# Patient Record
Sex: MALE | Race: WHITE | NOT HISPANIC OR LATINO | Employment: OTHER | URBAN - METROPOLITAN AREA
[De-identification: names, ages, dates, MRNs, and addresses within clinical notes are randomized per-mention and may not be internally consistent; named-entity substitution may affect disease eponyms.]

---

## 2020-07-02 ENCOUNTER — EVALUATION (OUTPATIENT)
Dept: PHYSICAL THERAPY | Facility: CLINIC | Age: 85
End: 2020-07-02
Payer: COMMERCIAL

## 2020-07-02 ENCOUNTER — TRANSCRIBE ORDERS (OUTPATIENT)
Dept: PHYSICAL THERAPY | Facility: CLINIC | Age: 85
End: 2020-07-02

## 2020-07-02 DIAGNOSIS — Z96.642 STATUS POST TOTAL REPLACEMENT OF LEFT HIP: Primary | ICD-10-CM

## 2020-07-02 PROCEDURE — 97161 PT EVAL LOW COMPLEX 20 MIN: CPT | Performed by: PHYSICAL THERAPIST

## 2020-07-02 RX ORDER — AMLODIPINE BESYLATE AND ATORVASTATIN CALCIUM 10; 10 MG/1; MG/1
1 TABLET, FILM COATED ORAL DAILY
COMMUNITY

## 2020-07-02 RX ORDER — ASPIRIN 325 MG
325 TABLET ORAL DAILY
COMMUNITY

## 2020-07-02 RX ORDER — LISINOPRIL 10 MG/1
10 TABLET ORAL DAILY
COMMUNITY

## 2020-07-02 RX ORDER — HYDROCODONE BITARTRATE AND ACETAMINOPHEN 5; 325 MG/1; MG/1
1 TABLET ORAL EVERY 6 HOURS PRN
COMMUNITY

## 2020-07-02 RX ORDER — CARVEDILOL 12.5 MG/1
12.5 TABLET ORAL 2 TIMES DAILY WITH MEALS
COMMUNITY

## 2020-07-02 NOTE — PROGRESS NOTES
PT Evaluation     Today's date: 2020  Patient name: Jorge Rose  : 1935  MRN: [id number]  Referring provider: Kylah Seo MD  Dx:   Encounter Diagnosis     ICD-10-CM    1  Status post total replacement of left hip Z96 642        Start Time: 1034  Stop Time: 1115  Total time in clinic (min): 41 minutes    Assessment/Plan  This patient is 2 days s/p L THR  Current problems include decreased ROM, decreased strength, impaired function and pain  He is a good candidate for skilled physical therapy to restore previous level of function  Progress per post-op protocol  Interventions to include manual therapy, ROM, stretching, strengthening, gait and balance training, neuromuscular re-ed and instruction in HEP  Duration/ Frequency: 2 x/week for 6-8 weeks    STGs: 4 weeks  1  Increase ROM R hip flexion to 90 degrees  2  Decrease pain 2-3 levels  3  Increase ROM  LTGs: 6-8 weeks  1  Independent HEP  2  Increase strength  3  Increase FOTO score to predicted level    Subjective  This is a 81 y o  male who underwent L THR on 20    Current complaint: pain L anterior thigh, buttock region, L groin  Occupation: retired  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[employer], mows fields  Patients goal: to get back to normal - able to carry drywall, work on car, etc    Objective  Pain scale: 0-10/10  Posture/observation: ambulates to therapy w/ wheeled walker  Integumentary: L hip incision is covered w/ bio-occlusive dressing  Functional mobility:    Ambulates w/ wheeled walker household and short community distances     Stairs: able to ascend / descend 3 steps in house w/ railing, step to step    Bed mobility: requires assistance to lift the LLE on/ off bed or mat    left     Hip   AROM  PROM  Strength  flexion 58 65 3-/5   extension      Abduction 17 17 2/5    adduction      ER      IR      Knee   AROM  PROM  Strength  Flexion   5/5   Extension   5/5   Ankle   AROM  PROM  Strength  DF   5/5   PF      Inversion 5/5   Eversion   5/5     right extremity ROM / strength WFL          Flowsheet Rows      Most Recent Value   PT/OT G-Codes   Current Score  26   Projected Score  54             Precautions: THR  POC expires: 8/2  Daily Treatment Record  Manuals 7/2            PROM L hip                                                    Neuro Re-Ed                                                                                                        Ther Ex             Heel slides             L hip fallout             LAQ             iso hip add             hooklying hip abd w/ TB             Standing hip abd             Standing hip flexion                                                                 Gait Training                                       Modalities             CP PRN

## 2020-07-02 NOTE — LETTER
2020    Val Corrales MD  [address] [place] [address]    Patient: Gabi Dave   YOB: 1935   Date of Visit: 2020     Encounter Diagnosis     ICD-10-CM    1  Status post total replacement of left hip U98 [id number]        Dear Dr Erica Iverson: Thank you for your recent referral of Gabi Dave  Please review the attached evaluation summary from [facility] recent visit  Please verify that you agree with the plan of care by signing the attached order  If you have any questions or concerns, please do not hesitate to call  I sincerely appreciate the opportunity to share in the care of one of your patients and hope to have another opportunity to work with you in the near future  Sincerely,    Devorah Garrett, PT      Referring Provider:      I certify that I have read the below Plan of Care and certify the need for these services furnished under this plan of treatment while under my care  Val Corrales MD  [address] [facility] [place] [address]  VIA Facsimile: [phone]          PT Evaluation     Today's date: 2020  Patient name: aGbi Dave  : 1935  MRN: [id number]  Referring provider: Juan Dsouza MD  Dx:   Encounter Diagnosis     ICD-10-CM    1  Status post total replacement of left hip Z96 642        Start Time: 1034  Stop Time: 1115  Total time in clinic (min): 41 minutes    Assessment/Plan  This patient is 2 days s/p L THR  Current problems include decreased ROM, decreased strength, impaired function and pain  He is a good candidate for skilled physical therapy to restore previous level of function  Progress per post-op protocol  Interventions to include manual therapy, ROM, stretching, strengthening, gait and balance training, neuromuscular re-ed and instruction in HEP  Duration/ Frequency: 2 x/week for 6-8 weeks    STGs: 4 weeks  1  Increase ROM R hip flexion to 90 degrees  2    Decrease pain 2-3 levels  3  Increase ROM  LTGs: 6-8 weeks  1  Independent HEP  2  Increase strength  3  Increase FOTO score to predicted level    Subjective  This is a 81 y o  male who underwent L THR on 6/30/20    Current complaint: pain L anterior thigh, buttock region, L groin  Occupation: retired  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[employer], mows fields  Patients goal: to get back to normal - able to carry drywall, work on car, etc    Objective  Pain scale: 0-10/10  Posture/observation: ambulates to therapy w/ wheeled walker  Integumentary: L hip incision is covered w/ bio-occlusive dressing  Functional mobility:    Ambulates w/ wheeled walker household and short community distances     Stairs: able to ascend / descend 3 steps in house w/ railing, step to step    Bed mobility: requires assistance to lift the LLE on/ off bed or mat    left     Hip   AROM  PROM  Strength  flexion 58 65 3-/5   extension      Abduction 17 17 2/5    adduction      ER      IR      Knee   AROM  PROM  Strength  Flexion   5/5   Extension   5/5   Ankle   AROM  PROM  Strength  DF   5/5   PF      Inversion   5/5   Eversion   5/5     right extremity ROM / strength WFL          Flowsheet Rows      Most Recent Value   PT/OT G-Codes   Current Score  26   Projected Score  54             Precautions: THR  POC expires: 8/2  Daily Treatment Record  Manuals 7/2            PROM L hip                                                    Neuro Re-Ed                                                                                                        Ther Ex             Heel slides             L hip fallout             LAQ             iso hip add             hooklying hip abd w/ TB             Standing hip abd             Standing hip flexion                                                                 Gait Training                                       Modalities             CP PRN

## 2020-07-08 ENCOUNTER — OFFICE VISIT (OUTPATIENT)
Dept: PHYSICAL THERAPY | Facility: CLINIC | Age: 85
End: 2020-07-08
Payer: COMMERCIAL

## 2020-07-08 DIAGNOSIS — Z96.642 STATUS POST TOTAL REPLACEMENT OF LEFT HIP: Primary | ICD-10-CM

## 2020-07-08 PROCEDURE — 97116 GAIT TRAINING THERAPY: CPT | Performed by: PHYSICAL THERAPIST

## 2020-07-08 PROCEDURE — 97140 MANUAL THERAPY 1/> REGIONS: CPT | Performed by: PHYSICAL THERAPIST

## 2020-07-08 NOTE — PROGRESS NOTES
Daily Note     Today's date: 2020  Patient name: Hilary Yuong  : 1935  MRN: [id number]  Referring provider: Alyson Cristobal MD  Dx:   Encounter Diagnosis     ICD-10-CM    1  Status post total replacement of left hip Z96 642        Start Time: 1105  Stop Time: 1159  Total time in clinic (min): 54 minutes    Subjective: not doing well; c/o severe pain in the L groin, buttock and low back; unable to sleep due to severe pain; LLE is swollen, states that he had US on Sat to r/o blood clot  Objective: See treatment diary below      Assessment: Patient presented to therapy ambulating w/ walker, FWB pushing walker in front of him  Reports that he is walking at home without walker  Patient observed to move on table in therapy without adherence  to hip precautiondTherapy consisted of gentle R hip PROM, reinforcement of hip precautions, gait and mobility training  Required frequent cueing to apply hip precautions to mobility activities  PROM very limited but was well tolerated  Applied CP at end of session  Reported feeling better at end of session    Patient would benefit from continued PT      Plan: Continue per plan of care        Precautions: THR  POC expires:   Daily Treatment Record  Manuals            PROM L hip  JR gentle                                                  Neuro Re-Ed                                                                                                        Ther Ex             Heel slides             L hip fallout             LAQ             iso hip add             hooklying hip abd w/ TB             Standing hip abd             Standing hip flexion                          Mat mobility training  8'                                     Gait Training             Walker,WBAT  9'                        Modalities             CP PRN

## 2020-07-13 ENCOUNTER — APPOINTMENT (OUTPATIENT)
Dept: PHYSICAL THERAPY | Facility: CLINIC | Age: 85
End: 2020-07-13
Payer: COMMERCIAL

## 2020-07-15 ENCOUNTER — OFFICE VISIT (OUTPATIENT)
Dept: PHYSICAL THERAPY | Facility: CLINIC | Age: 85
End: 2020-07-15
Payer: COMMERCIAL

## 2020-07-15 DIAGNOSIS — Z96.642 STATUS POST TOTAL REPLACEMENT OF LEFT HIP: Primary | ICD-10-CM

## 2020-07-15 PROCEDURE — 97110 THERAPEUTIC EXERCISES: CPT | Performed by: PHYSICAL THERAPIST

## 2020-07-15 NOTE — PROGRESS NOTES
Daily Note     Today's date: 7/15/2020  Patient name: Hayes Torres  : 1935  MRN: [id number]  Referring provider: Shauna Juárez MD  Dx:   Encounter Diagnosis     ICD-10-CM    1  Status post total replacement of left hip Z96 642        Start Time: 5  Stop Time: 6980  Total time in clinic (min): 34 minutes    Subjective: feeling much better but still has pain in the lateral hip region; not taking any pain meds now except Tylenol; was seen for follow up with surgeon who told him his hip looks good      Objective: See treatment diary below      Assessment:  Mobility is much improved since lv - now able to transfer supine<->sit independently, without difficulty lifting leg on/ off table  Reports some pain with exercises but overall, tolerated therapy well today  Was advised to continue with walker until next therapy visit due to still having relatively high pain levels  Patient would benefit from continued PT      Plan: Continue per plan of care        Precautions: THR 20  POC expires:   Access code: [facility]     Daily Treatment Record  Manuals 7/2 7/8 7/15          PROM L hip  JR gentle JR                                                 Neuro Re-Ed                                                                                                        Ther Ex             Heel slides   10x          L hip fallout   10x          LAQ   15x10"          iso hip add   10x10"          hooklying hip abd w/ TB             Standing hip abd   10x          Standing hip flexion march   10x          Standing SLR flexion   10x          Mat mobility training  8'                                     Gait Training             Walker,WBAT  9'                        Modalities             CP PRN

## 2020-07-20 ENCOUNTER — OFFICE VISIT (OUTPATIENT)
Dept: PHYSICAL THERAPY | Facility: CLINIC | Age: 85
End: 2020-07-20
Payer: COMMERCIAL

## 2020-07-20 DIAGNOSIS — Z96.642 STATUS POST TOTAL REPLACEMENT OF LEFT HIP: Primary | ICD-10-CM

## 2020-07-20 PROCEDURE — 97110 THERAPEUTIC EXERCISES: CPT | Performed by: PHYSICAL THERAPIST

## 2020-07-20 NOTE — PROGRESS NOTES
Daily Note     Today's date: 2020  Patient name: Nicholas Parkinson  : 1935  MRN: [id number]  Referring provider: Lesly Jones MD  Dx:   Encounter Diagnosis     ICD-10-CM    1  Status post total replacement of left hip Z96 642        Start Time:   Stop Time:   Total time in clinic (min): 33 minutes    Subjective: reports that he attempted to lift a 40# bag in his garage, twisting to the side with his feet planted, states that it felt like the ball started to come out of the socket    Objective: See treatment diary below      Assessment:  Reviewed hip precautions and importance of avoiding twisting hips, instructed to  feet when turning to avoid twisting operated hip and risking dislocation  Patient would benefit from continued PT      Plan: Continue per plan of care        Precautions: THR 20  POC expires:   Access code: [facility]     Daily Treatment Record  Manuals 7/2 7/8 7/15 7/20         PROM L hip  JR gentle  JR                                                 Neuro Re-Ed                                                                                                        Ther Ex             Heel slides   10x 10x         L hip fallout   10x 10x         LAQ   15x10"          iso hip add   10x10" 10x10"         hooklying hip abd w/ TB             Standing hip abd   10x 10x         Standing hip flexion march   10x 10x         Standing SLR flexion   10x 10x         Mat mobility training  8'                                     Gait Training             Walker,WBAT  9'                        Modalities             CP PRN

## 2020-07-23 ENCOUNTER — OFFICE VISIT (OUTPATIENT)
Dept: PHYSICAL THERAPY | Facility: CLINIC | Age: 85
End: 2020-07-23
Payer: COMMERCIAL

## 2020-07-23 DIAGNOSIS — Z96.642 STATUS POST TOTAL REPLACEMENT OF LEFT HIP: Primary | ICD-10-CM

## 2020-07-23 PROCEDURE — 97140 MANUAL THERAPY 1/> REGIONS: CPT

## 2020-07-23 PROCEDURE — 97110 THERAPEUTIC EXERCISES: CPT

## 2020-07-23 PROCEDURE — 97112 NEUROMUSCULAR REEDUCATION: CPT

## 2020-07-23 NOTE — PROGRESS NOTES
Daily Note     Today's date: 2020  Patient name: Kim Branes  : 1935  MRN: [id number]  Referring provider: Latonia Kahn MD  Dx:   Encounter Diagnosis     ICD-10-CM    1  Status post total replacement of left hip Z96 642                   Subjective: Pt  Reports he is having less overall pain  He continues to be frustrated with his progress- he was re-educated on his surgery and expectation of progress so far and typical rehab process  Objective: See treatment diary below      Assessment:  Pt  continues to benefit from manual stretching- he reported much less discomfort this visit from last visit  Also improved tolerance of TE's with less discomfort  Gait training without SPC today went well- he has advised to use for longer distances and when he is fatigued but to d/c for around the house and short distances without the community,  Patient would benefit from continued PT      Plan: Continue per plan of care        Precautions: THR 20  POC expires:   Access code: [facility]     Daily Treatment Record  Manuals 7/2 7/8 7/15 7/20 7/23        PROM L hip  JR gentle  JR  WE                                               Neuro Re-Ed                                                                                                        Ther Ex             Heel slides   10x 10x 10"x  10        L hip fallout   10x 10x         LAQ   15x10"  15x  10"        Supine marches     10 ea        iso hip add   10x10" 10x10" 10"x  10        hooklying hip abd w/ TB             Standing hip abd   10x 10x 15        Standing hip flexion march   10x 10x 15        Standing SLR flexion   10x 10x 15        Mat mobility training  8'                                     Gait Training             Walker,WBAT  9'                        Modalities             CP PRN

## 2020-07-27 ENCOUNTER — APPOINTMENT (OUTPATIENT)
Dept: PHYSICAL THERAPY | Facility: CLINIC | Age: 85
End: 2020-07-27
Payer: COMMERCIAL

## 2020-07-29 ENCOUNTER — APPOINTMENT (OUTPATIENT)
Dept: PHYSICAL THERAPY | Facility: CLINIC | Age: 85
End: 2020-07-29
Payer: COMMERCIAL

## 2020-08-03 ENCOUNTER — OFFICE VISIT (OUTPATIENT)
Dept: PHYSICAL THERAPY | Facility: CLINIC | Age: 85
End: 2020-08-03
Payer: COMMERCIAL

## 2020-08-03 DIAGNOSIS — Z96.642 STATUS POST TOTAL REPLACEMENT OF LEFT HIP: Primary | ICD-10-CM

## 2020-08-03 PROCEDURE — 97110 THERAPEUTIC EXERCISES: CPT | Performed by: PHYSICAL THERAPIST

## 2020-08-03 NOTE — PROGRESS NOTES
Daily Note     Today's date: 8/3/2020  Patient name: Iola Boas  : 1935  MRN: [id number]  Referring provider: Kenrick Banuelos MD  Dx:   Encounter Diagnosis     ICD-10-CM    1  Status post total replacement of left hip  Z96 642        Start Time: 1101  Stop Time: 2163  Total time in clinic (min): 41 minutes    Subjective: states he doesn't have the groin pain anymore; missed last two appointments due to not feeling well- severe HA - is taking antibiotics for a sinus infection but states that he is feeling good       Objective: See treatment diary below      Assessment: Tolerated treatment well  Strength and function are improving  Patient would benefit from a few additional visits to transition to independent HEP      Plan: Continue per plan of care        Precautions: THR 20  POC expires:   Access code: [facility]     Daily Treatment Record  Manuals 7/2 7/8 7/15 7/20 7/23 8/3       PROM L hip  JR gentle JR JR  WE np                                              Neuro Re-Ed                                                                                                        Ther Ex             Heel slides   10x 10x 10"x  10 np       L hip fallout   10x 10x  10x       LAQ   15x10"  15x  10"        Supine marches     10 ea 15xea       iso hip add   10x10" 10x10" 10"x  10 15x10"       hooklying hip abd w/ TB      GTB 10x 10"       Standing hip abd   10x 10x 15        Standing hip flexion march   10x 10x 15        Standing SLR flexion   10x 10x 15 supine 15x       bike  8'    13min       Step ups 8in      10       Step downs 8in      10       Mini squats      10x5"       Gait Training             Walker,WBAT  9'                        Modalities             CP PRN

## 2020-08-05 ENCOUNTER — OFFICE VISIT (OUTPATIENT)
Dept: PHYSICAL THERAPY | Facility: CLINIC | Age: 85
End: 2020-08-05
Payer: COMMERCIAL

## 2020-08-05 DIAGNOSIS — Z96.642 STATUS POST TOTAL REPLACEMENT OF LEFT HIP: Primary | ICD-10-CM

## 2020-08-05 PROCEDURE — 97140 MANUAL THERAPY 1/> REGIONS: CPT | Performed by: PHYSICAL THERAPIST

## 2020-08-05 PROCEDURE — 97110 THERAPEUTIC EXERCISES: CPT | Performed by: PHYSICAL THERAPIST

## 2020-08-05 NOTE — PROGRESS NOTES
PT Re-Evaluation  and PT Discharge    Today's date: 2020  Patient name: Hayes Torres  : 1935  MRN: [id number]  Referring provider: Shauna Juárez MD  Dx:   Encounter Diagnosis     ICD-10-CM    1  Status post total replacement of left hip  Z96 642        Start Time: 1059  Stop Time: 8524  Total time in clinic (min): 46 minutes    Assessment/Plan  This patient demonstrates improvement since beginning therapy and has met the goals which were set for him  He is independent with HEP and is ready for DC to self-directed program     STGs: 4 weeks  1  Increase ROM R hip flexion to 90 degrees - MET  2  Decrease pain 2-3 levels - MET  3  Increase ROM  LTGs: 6-8 weeks  1  Independent HEP - MET  2  Increase strength - MET  3  Increase FOTO score to predicted level - MET    Subjective  This is a 81 y o  male who underwent L THR on 20    Current complaint: L leg is stiff to the point that it is difficult to put socks on, feels tired overall at the end; does not sleep well but not due to hip pain; reports that hip is doing well  Occupation: retired  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, mows fields  Patients goal: to get back to normal - able to carry drywall, work on car, etc    Objective  Pain scale: 0-1/10  Posture/observation: ambulates without AD; gait WNL  Functional mobility:    Ambulates w/ out AD functional community distances     Stairs: able to ascend / descend flight of stairs w/ railing, step over step    Bed mobility: independent    left     Hip   AROM  PROM  Strength  flexion 90 90 4+/5   extension  WFL 5/5   Abduction 28 30 5/5    adduction      ER 25 25    IR      Knee   AROM  PROM  Strength  Flexion   5/5   Extension   5/5   Ankle   AROM  PROM  Strength  DF   5/5   PF      Inversion   5/5   Eversion   5/5     right extremity ROM / strength WFL          Precautions: THR 20  POC expires:   Access code: [facility]     Daily Treatment Record  Manuals 7/2 7/8 7/15 7/20 7/23 8/3 8/5 PROM L hip  JR gentle JR JR  WE np JR                                             Neuro Re-Ed                                                                                                        Ther Ex             clamshells w/ pillow       10x5"      slr flexion       10x      Seated hip ER AROM       10x      Heel slides   10x 10x 10"x  10 np       L hip fallout   10x 10x  10x 10x      LAQ   15x10"  15x  10"        Supine marches     10 ea 15xea       iso hip add   10x10" 10x10" 10"x  10 15x10"       hooklying hip abd w/ TB      GTB 10x 10"       Standing hip abd   10x 10x 15        Standing hip flexion march   10x 10x 15        Standing SLR flexion   10x 10x 15 supine 15x       bike  8'    13min 6min      Step ups 8in      10 15x      Step downs 8in      10 5x      Mini squats      10x5" 10x5"      Gait Training             Walker,WBAT  9'                        Modalities             CP PRN                               Flowsheet Rows      Most Recent Value   PT/OT G-Codes   Current Score  69   Projected Score  54

## 2020-08-10 ENCOUNTER — APPOINTMENT (OUTPATIENT)
Dept: PHYSICAL THERAPY | Facility: CLINIC | Age: 85
End: 2020-08-10
Payer: COMMERCIAL

## 2020-08-12 ENCOUNTER — APPOINTMENT (OUTPATIENT)
Dept: PHYSICAL THERAPY | Facility: CLINIC | Age: 85
End: 2020-08-12
Payer: COMMERCIAL

## 2020-08-17 ENCOUNTER — APPOINTMENT (OUTPATIENT)
Dept: PHYSICAL THERAPY | Facility: CLINIC | Age: 85
End: 2020-08-17
Payer: COMMERCIAL

## 2020-08-19 ENCOUNTER — APPOINTMENT (OUTPATIENT)
Dept: PHYSICAL THERAPY | Facility: CLINIC | Age: 85
End: 2020-08-19
Payer: COMMERCIAL

## 2024-08-24 ENCOUNTER — HOSPITAL ENCOUNTER (OUTPATIENT)
Dept: HOSPITAL 99 - RAD | Age: 89
End: 2024-08-24
Payer: COMMERCIAL

## 2024-08-24 DIAGNOSIS — R06.89: Primary | ICD-10-CM

## 2024-09-14 ENCOUNTER — HOSPITAL ENCOUNTER (OUTPATIENT)
Dept: HOSPITAL 99 - REG | Age: 89
End: 2024-09-14
Payer: COMMERCIAL

## 2024-09-14 DIAGNOSIS — I25.10: Primary | ICD-10-CM

## 2024-09-14 DIAGNOSIS — E78.00: ICD-10-CM

## 2024-09-14 DIAGNOSIS — I10: ICD-10-CM

## 2024-09-14 LAB
ALBUMIN SERPL-MCNC: 4.2 G/DL (ref 3.5–5)
ALP SERPL-CCNC: 102 U/L (ref 38–126)
ALT SERPL-CCNC: 18 U/L (ref 0–50)
AST SERPL-CCNC: 25 U/L (ref 17–59)
BUN SERPL-MCNC: 24 MG/DL (ref 9–20)
CALCIUM SERPL-MCNC: 9.5 MG/DL (ref 8.4–10.2)
CHLORIDE SERPL-SCNC: 105 MMOL/L (ref 98–107)
CO2 SERPL-SCNC: 23 MMOL/L (ref 22–30)
EGFR: 48.34
ERYTHROCYTE [DISTWIDTH] IN BLOOD BY AUTOMATED COUNT: 13.9 % (ref 11.5–14.5)
GLUCOSE SERPL-MCNC: 127 MG/DL (ref 70–99)
HCT VFR BLD AUTO: 35.7 % (ref 39–52)
HDLC SERPL-MCNC: 39 MG/DL
HGB BLD-MCNC: 12.3 G/DL (ref 13–18)
LDLC SERPL CALC-MCNC: 201 MG/DL
MCHC RBC AUTO-ENTMCNC: 34.5 G/DL (ref 33–37)
MCV RBC AUTO: 86.7 FL (ref 80–94)
NRBC BLD AUTO-RTO: 0 %
PLATELET # BLD AUTO: 260 10^3/UL (ref 130–400)
POTASSIUM SERPL-SCNC: 4.9 MMOL/L (ref 3.5–5.1)
PROT SERPL-MCNC: 6.6 G/DL (ref 6.3–8.2)
SODIUM SERPL-SCNC: 139 MMOL/L (ref 135–145)
VLDLC SERPL CALC-MCNC: 59 MG/DL (ref 0–30)

## 2024-09-17 ENCOUNTER — HOSPITAL ENCOUNTER (OUTPATIENT)
Dept: HOSPITAL 99 - CATH | Age: 89
LOS: 1 days | Discharge: HOME | End: 2024-09-18
Payer: COMMERCIAL

## 2024-09-17 VITALS
SYSTOLIC BLOOD PRESSURE: 122 MMHG | RESPIRATION RATE: 21 BRPM | OXYGEN SATURATION: 2 % | DIASTOLIC BLOOD PRESSURE: 88 MMHG

## 2024-09-17 VITALS — DIASTOLIC BLOOD PRESSURE: 65 MMHG | SYSTOLIC BLOOD PRESSURE: 153 MMHG

## 2024-09-17 VITALS — SYSTOLIC BLOOD PRESSURE: 121 MMHG | DIASTOLIC BLOOD PRESSURE: 61 MMHG | RESPIRATION RATE: 20 BRPM

## 2024-09-17 VITALS — SYSTOLIC BLOOD PRESSURE: 139 MMHG | RESPIRATION RATE: 9 BRPM | DIASTOLIC BLOOD PRESSURE: 62 MMHG

## 2024-09-17 VITALS — RESPIRATION RATE: 15 BRPM

## 2024-09-17 VITALS — DIASTOLIC BLOOD PRESSURE: 66 MMHG | SYSTOLIC BLOOD PRESSURE: 133 MMHG | RESPIRATION RATE: 14 BRPM

## 2024-09-17 VITALS — BODY MASS INDEX: 27.8 KG/M2

## 2024-09-17 VITALS — DIASTOLIC BLOOD PRESSURE: 68 MMHG | SYSTOLIC BLOOD PRESSURE: 157 MMHG

## 2024-09-17 VITALS — SYSTOLIC BLOOD PRESSURE: 167 MMHG | DIASTOLIC BLOOD PRESSURE: 67 MMHG

## 2024-09-17 VITALS — RESPIRATION RATE: 11 BRPM

## 2024-09-17 VITALS — RESPIRATION RATE: 13 BRPM | DIASTOLIC BLOOD PRESSURE: 64 MMHG | SYSTOLIC BLOOD PRESSURE: 149 MMHG

## 2024-09-17 VITALS — RESPIRATION RATE: 18 BRPM

## 2024-09-17 VITALS — RESPIRATION RATE: 12 BRPM

## 2024-09-17 VITALS — DIASTOLIC BLOOD PRESSURE: 76 MMHG | SYSTOLIC BLOOD PRESSURE: 175 MMHG

## 2024-09-17 VITALS — DIASTOLIC BLOOD PRESSURE: 61 MMHG | SYSTOLIC BLOOD PRESSURE: 153 MMHG | RESPIRATION RATE: 9 BRPM

## 2024-09-17 VITALS — DIASTOLIC BLOOD PRESSURE: 74 MMHG | SYSTOLIC BLOOD PRESSURE: 155 MMHG

## 2024-09-17 VITALS — DIASTOLIC BLOOD PRESSURE: 59 MMHG | SYSTOLIC BLOOD PRESSURE: 134 MMHG | RESPIRATION RATE: 8 BRPM

## 2024-09-17 VITALS — RESPIRATION RATE: 9 BRPM

## 2024-09-17 VITALS — RESPIRATION RATE: 20 BRPM

## 2024-09-17 VITALS — SYSTOLIC BLOOD PRESSURE: 136 MMHG | DIASTOLIC BLOOD PRESSURE: 93 MMHG

## 2024-09-17 VITALS — RESPIRATION RATE: 10 BRPM

## 2024-09-17 VITALS — RESPIRATION RATE: 8 BRPM

## 2024-09-17 VITALS — DIASTOLIC BLOOD PRESSURE: 75 MMHG | SYSTOLIC BLOOD PRESSURE: 169 MMHG

## 2024-09-17 VITALS — SYSTOLIC BLOOD PRESSURE: 155 MMHG | DIASTOLIC BLOOD PRESSURE: 76 MMHG

## 2024-09-17 VITALS — DIASTOLIC BLOOD PRESSURE: 65 MMHG | SYSTOLIC BLOOD PRESSURE: 161 MMHG

## 2024-09-17 VITALS — RESPIRATION RATE: 13 BRPM

## 2024-09-17 DIAGNOSIS — M10.9: ICD-10-CM

## 2024-09-17 DIAGNOSIS — E78.5: ICD-10-CM

## 2024-09-17 DIAGNOSIS — I49.8: ICD-10-CM

## 2024-09-17 DIAGNOSIS — I51.81: ICD-10-CM

## 2024-09-17 DIAGNOSIS — I45.10: ICD-10-CM

## 2024-09-17 DIAGNOSIS — I12.9: ICD-10-CM

## 2024-09-17 DIAGNOSIS — N18.30: ICD-10-CM

## 2024-09-17 DIAGNOSIS — I44.0: ICD-10-CM

## 2024-09-17 DIAGNOSIS — Z79.02: ICD-10-CM

## 2024-09-17 DIAGNOSIS — K21.9: ICD-10-CM

## 2024-09-17 DIAGNOSIS — G47.33: ICD-10-CM

## 2024-09-17 DIAGNOSIS — R06.09: ICD-10-CM

## 2024-09-17 DIAGNOSIS — Z79.82: ICD-10-CM

## 2024-09-17 DIAGNOSIS — I25.119: Primary | ICD-10-CM

## 2024-09-17 DIAGNOSIS — M19.90: ICD-10-CM

## 2024-09-17 LAB
ACT BLD: 226 SECONDS (ref 116–155)
ACT BLD: 247 SECONDS (ref 116–155)
ACT BLD: 248 SECONDS (ref 116–155)

## 2024-09-17 PROCEDURE — C1894 INTRO/SHEATH, NON-LASER: HCPCS

## 2024-09-17 PROCEDURE — C1725 CATH, TRANSLUMIN NON-LASER: HCPCS

## 2024-09-17 PROCEDURE — C9600 PERC DRUG-EL COR STENT SING: HCPCS

## 2024-09-17 PROCEDURE — C1769 GUIDE WIRE: HCPCS

## 2024-09-17 PROCEDURE — C1874 STENT, COATED/COV W/DEL SYS: HCPCS

## 2024-09-17 PROCEDURE — 93799 UNLISTED CV SVC/PROCEDURE: CPT

## 2024-09-17 RX ADMIN — ROSUVASTATIN CALCIUM 20 MG: 20 TABLET, FILM COATED ORAL at 16:49

## 2024-09-17 RX ADMIN — Medication 5 MG: at 22:03

## 2024-09-17 RX ADMIN — AMLODIPINE BESYLATE 5 MG: 5 TABLET ORAL at 20:11

## 2024-09-17 RX ADMIN — TAMSULOSIN HYDROCHLORIDE 0.4 MG: 0.4 CAPSULE ORAL at 16:49

## 2024-09-17 RX ADMIN — SODIUM CHLORIDE 235 ML: 900 INJECTION, SOLUTION INTRAVENOUS at 09:38

## 2024-09-17 RX ADMIN — FAMOTIDINE 20 MG: 20 TABLET, FILM COATED ORAL at 16:49

## 2024-09-17 RX ADMIN — HYDRALAZINE HYDROCHLORIDE 10 MG: 10 TABLET, FILM COATED ORAL at 20:11

## 2024-09-17 RX ADMIN — ASPIRIN 81 MG: 81 TABLET, CHEWABLE ORAL at 09:46

## 2024-09-17 RX ADMIN — CARVEDILOL 6.25 MG: 6.25 TABLET, FILM COATED ORAL at 20:11

## 2024-09-17 RX ADMIN — FEBUXOSTAT 40 MG: 40 TABLET, COATED ORAL at 20:41

## 2024-09-17 NOTE — PTCARENOTE
Patient received from the cath lab. Right radial band intact. Patient stood from stretcher and walked a few steps into bed.

## 2024-09-17 NOTE — ITS.CL.CATH
"Cath Lab - Catheterization"~"Cardiac Catheterization"~"Procedure Report: "~"LEFT HEART CATH AND CORONARY INTERVENTION"~"Date of Procedure: September 17, 2024"~"Referring: Dr. Jake Jackson"~"PROCEDURES: "~"1.  Left heart catheterization with coronary angiography"~"2.  Hemodynamic assessment of LAD with a [place] Verrata wire with the iFR serially measuring below the ischemic threshold at 0.83, 0.84, and 0.84.  The right coronary artery is collateralized from the LAD which may worsen the iFR results"~"3.  Successful stenting of proximal to mid LAD jailing the first diagonal branch with placement of a 3.0 x 30 mm Bala stent that was postdilated to high pressures with a 3.5 mm noncompliant balloon to 12 gabriel distally and 14-16 gabriel in the proximal "~"midportion of the stent"~"INDICATION: This is an 89-year-old gentleman with a past medical history notable for hypertension, anxiety, hyperlipidemia, and gout.  He had been tried on atorvastatin in the past but experienced myalgias.  He states that he is experienced "~"midepigastric discomfort at times and has been evaluated for GI and pulmonary etiology.  He is currently maintained on dual antianginal medications chronically and continues to experience intermittent epigastric discomfort and exertional dyspnea for "~"which she is now referred for coronary angiography.  His LDL cholesterol has been under 200 mg/dL.  He has been fine on atorvastatin and ezetimibe which have caused symptoms of fatigue and muscle aches."~"ACCESS: Right radial artery, 6 Tamazight sheath (note: Patient has a prominent vascular loop in the mid forearm which was successfully navigated and we are able to complete the procedure from the right radial approach.  However, the patient did "~"experience discomfort in the right shoulder and bicep area for much of the procedure)"~"HEMODYNAMICS (mmHg):"~"AO (s/d, m) : 158/69"~"LV (s/d) : 154/4"~"LVEDP : 11"~"CORONARY FINDINGS"~"Dominance: Right"~"LEFT MAIN: Normal"~"LEFT ANTERIOR DESCENDING: The LAD arises normally from the left main and runs in the anterior interventricular groove.  The proximal LAD has tandem 60% stenosis involving the origin of the first diagonal branch.  The first diagonal branches a small "~"caliber vessel that has a stable 80% mid stenosis.  The remainder of the LAD has luminal irregularities but no focal obstructive stenosis.  Well-developed collaterals to the PDA are noted"~"CIRCUMFLEX: The circumflex is a medium caliber nondominant vessel.  The first obtuse marginal branch is a small to medium caliber vessel with a 60-70% ostial/proximal stenosis.  The mid circumflex extending into OM 2 has a 60% stenosis.  The "~"stenotic segment is a Laird 111 bifurcation class."~"RIGHT CORONARY: The right coronary artery is a dominant vessel with a focal 80% proximal stenosis.  The RCA is known to supply to RV marginal branches.  The RCA tapers beyond the first RV marginal branch and becomes occluded at the second RV "~"marginal branch with the distal vessel noted to fill via left to right collaterals to the PDA and posterolateral branch"~"VENTRICULOGRAPHY: Not done"~"HEMODYNAMIC ASSESSMENT OF THE LAD WITH A VOLCANO OMNI WIRE: The origin of the left main was cannulated with a 6 Fr EBU 3.5 guide catheter.  Intravenous heparin was administered and the ACT was followed during the procedure.  Two hundred micrograms "~"of intracoronary nitroglycerin was given through the guide catheter.  A [place] Omni wire was advanced to the guide catheter tip and normalized to guide catheter pressure.  The Omni wire was then carefully manipulated across the stenosis in the LAD "~"with the iFR serially measuring below the ischemic threshold at 0.83, 0.84, and 0.84.    "~"ANGIOPLASTY PROCEDURE DETAIL: Upon review of the diagnostic catheterization films the decision was made to proceed with percutaneous revascularization of the LAD in the hopes that it would improve symptoms.  Intravenous heparin was administered and "~"the ACT was monitored throughout the procedure.  A 600 mg loading dose of clopidogrel was administered prior to the interventional procedure."~"A BMW guidewire was advanced through the EBU 3.5 guiding catheter and across the proximal to mid LAD stenosis and into the distal vessel.  Primary stenting was undertaken with placement of a 3.0 x 30 mm [place] stent that was implanted at nominal "~"pressures and postdilated with a 3.5 mm noncompliant balloon to nominal pressures distally and between 14 and 16 gabriel in the proximal and midportion of the stent."~"RADIATION SUMMARY:  Fluoro Time (min): 12.1, Dose (mGy): 683, DAP (Gy.cm2) : 56.4"~"CONCLUSIONS"~"1.  Coronary artery disease involving the proximal to mid LAD which was successfully stented with placement of a 3.0 x 30 mm Bala stent.  The right coronary artery is now noted to be 100% occluded with the distal vessel filling via left-to-right "~"collaterals.  The occluded segment in the RCA is just at the origin of a second RV marginal branch and the length of occlusion is uncertain.  Given chronicity of symptoms I thought it would be better to treat the LAD stenosis and attempt to improve "~"symptoms."~"RECOMMENDATIONS"~"1.  Uninterrupted dual antiplatelet therapy for 6-12 months"~"2.  Will add long-acting nitrate for further titration to see if anginal symptoms will improve"~"3.  Continue oral beta-blocker and amlodipine.  Goal blood pressure less than 130/80"~"4.  Last LDL cholesterol measured 200 mg/dL.  Will start patient on rosuvastatin.  He reports 'difficulty taking statins' in the past.  However, there has been significant in the coronary artery disease and needs further risk modification as "~"tolerated"~"Copy to: Dr. Jake Jackson"

## 2024-09-17 NOTE — PTCARENOTE
"Pt rec'd at change of shift awake,alert with right radial site DDI, no active bleeding or hematoma present-good radial pulse"~"Sinus with BB on telemetry. Pt requested Melatonin at HS-order obtained,dose given"

## 2024-09-18 VITALS — RESPIRATION RATE: 20 BRPM

## 2024-09-18 VITALS — SYSTOLIC BLOOD PRESSURE: 149 MMHG | DIASTOLIC BLOOD PRESSURE: 70 MMHG | RESPIRATION RATE: 20 BRPM

## 2024-09-18 VITALS — SYSTOLIC BLOOD PRESSURE: 118 MMHG | DIASTOLIC BLOOD PRESSURE: 67 MMHG

## 2024-09-18 VITALS — SYSTOLIC BLOOD PRESSURE: 120 MMHG | DIASTOLIC BLOOD PRESSURE: 73 MMHG

## 2024-09-18 LAB
BUN SERPL-MCNC: 25 MG/DL (ref 9–20)
CALCIUM SERPL-MCNC: 9.6 MG/DL (ref 8.4–10.2)
CHLORIDE SERPL-SCNC: 103 MMOL/L (ref 98–107)
CO2 SERPL-SCNC: 20 MMOL/L (ref 22–30)
EGFR: 52.84
ERYTHROCYTE [DISTWIDTH] IN BLOOD BY AUTOMATED COUNT: 14 % (ref 11.5–14.5)
ESTIMATED CREATININE CLEARANCE: 35 ML/MIN
GLUCOSE SERPL-MCNC: 118 MG/DL (ref 70–99)
HCT VFR BLD AUTO: 35.9 % (ref 39–52)
HDLC SERPL-MCNC: 37 MG/DL
HGB BLD-MCNC: 12.7 G/DL (ref 13–18)
LDLC SERPL CALC-MCNC: 187 MG/DL
MCHC RBC AUTO-ENTMCNC: 35.4 G/DL (ref 33–37)
MCV RBC AUTO: 85.1 FL (ref 80–94)
PLATELET # BLD AUTO: 263 10^3/UL (ref 130–400)
POTASSIUM SERPL-SCNC: 4.8 MMOL/L (ref 3.5–5.1)
SODIUM SERPL-SCNC: 138 MMOL/L (ref 135–145)
VLDLC SERPL CALC-MCNC: 64 MG/DL (ref 0–30)

## 2024-09-18 RX ADMIN — ASPIRIN 81 MG: 81 TABLET, CHEWABLE ORAL at 08:51

## 2024-09-18 RX ADMIN — PANTOPRAZOLE SODIUM 40 MG: 40 TABLET, DELAYED RELEASE ORAL at 08:51

## 2024-09-18 RX ADMIN — HYDRALAZINE HYDROCHLORIDE 10 MG: 10 TABLET, FILM COATED ORAL at 08:51

## 2024-09-18 RX ADMIN — AMLODIPINE BESYLATE 5 MG: 5 TABLET ORAL at 08:51

## 2024-09-18 RX ADMIN — DIPHENHYDRAMINE HYDROCHLORIDE 25 MG: 25 CAPSULE ORAL at 03:30

## 2024-09-18 RX ADMIN — LOSARTAN POTASSIUM 25 MG: 25 TABLET, FILM COATED ORAL at 08:52

## 2024-09-18 RX ADMIN — CLOPIDOGREL BISULFATE 75 MG: 75 TABLET ORAL at 08:51

## 2024-09-18 RX ADMIN — ISOSORBIDE MONONITRATE 30 MG: 30 TABLET, EXTENDED RELEASE ORAL at 08:51

## 2024-09-18 RX ADMIN — CARVEDILOL 6.25 MG: 6.25 TABLET, FILM COATED ORAL at 08:51

## 2024-09-18 NOTE — CM
"CM following for DC planning needs. "~"Met w/ patient at bedside to complete initial assessment. "~"Pt. resides w/ spouse in a private home. He is functionally indep. at baseline. [place] is quite indep[place] caring for it."~"Anticipated DC plan is for home, no needs. "

## 2024-09-18 NOTE — PTCARENOTE
"Pt with multitude of health complaints during the night including post nasal drip that was keeping him awake and upsetting his stomach,feelings of anxiety, temporary numbness in hands-resolved  (hx b/l shoulder joint pain-pt usually sleeps on "~"stomach) and 'I can't breathe' little after mn but stated 'I feel better when your here'. "~"Pt medicated with Tylenol for shoulder discomfort and Benadryl for c/o constant post nasal drip. VS stable. Emotional support given."

## 2024-09-18 NOTE — W.DS.TRANS
"DC Summary - Transcription"~"-"~"Discharge Instructions: "~"Discharge Diagnosis/Procedures	Angioplasty and stent to Left Anterior          	"~"                              	Descending artery                               	"~"Diet                          	Low Cholesterol                                 	"~"Driving Restrictions          	No driving for 24 hours                         	"~"Other Services                	Cardiac Rehab                                   	"~"Instructions:       "~"Stand-Alone Forms:  DC Instructions- Cath/EP Lab"~"Changes to Home Medications: Yes"~"Discharge Medications: "~"DC Medications w/original date entered in [employer]"~"omega 3-dha-epa-fish oil 500 mg (200mg-300mg)-1,000 mg capsule 900 mg PO DAILY 06/12/20 "~"clonazepam 0.5 mg tablet 0.5 mg PO Q8HPRN PRN anxiety ##0 06/30/20 "~"hydrocodone 5 mg-acetaminophen 325 mg tablet 1 tab PO Q6HPRN PRN moderate-severe pain #30 tabs 06/30/20 "~"carvedilol 6.25 mg tablet 6.25 mg PO BID #60 tabs 10/30/23 "~"nitroglycerin 0.4 mg sublingual tablet 0.4 mg sublingual Q5-15M PRN chest pain #20 tabs 10/30/23 "~"acetaminophen 325 mg capsule (Tylenol) 500 mg PO DAILYPRN PRN pain 09/17/24 "~"amlodipine 5 mg tablet 5 mg PO 09/17/24 "~"cholecalciferol (vitamin D3) 25 mcg (1,000 unit) capsule (Vitamin D3) 25 mcg PO DAILY 09/17/24 "~"famotidine 20 mg tablet 20 mg PO QPM 09/17/24 "~"febuxostat 40 mg tablet 40 mg PO QPM Gout 09/17/24 "~"hydralazine 10 mg tablet 10 mg PO BID 09/17/24 "~"losartan 25 mg tablet 25 mg PO DAILY 09/17/24 "~"melatonin 5 mg tablet 5 mg PO HS PRN sleep 09/17/24 "~"multivitamin 1 tab PO DAILY 09/17/24 "~"tamsulosin 0.4 mg capsule 0.4 mg PO QPM 09/17/24 "~"turmeric 400 mg capsule 400 mg PO DAILY 09/17/24 "~"aspirin 81 mg chewable tablet 81 mg PO DAILY #0 tabs 09/18/24 "~"clopidogrel 75 mg tablet 75 mg PO DAILY #90 tabs 09/18/24 "~"isosorbide mononitrate 30 mg tablet,extended release 24 hr 30 mg PO DAILY #30 tabs 09/18/24 "~"pantoprazole 40 mg tablet,delayed release 40 mg PO DAILY #90 tabs 09/18/24 "~"rosuvastatin 20 mg tablet 20 mg PO QPM #30 tabs 09/18/24 "~"Home Medication Changes  "~"new to rosuvastatin, protonix, isosorbide, plavix, aspirin decreased to daily from bid"~"Pending Results: No"

## 2024-09-18 NOTE — W.PN.CARDCBS
"Addendum entered and electronically signed by Kirk Porras MD  09/18/24 13:53: "~"I saw and examined the patient."~"The [employer] Professional's note was reviewed and I agree with the note."~"Comment:  "~"GEN: No distress, awake, Ox3"~"HEENT: supple, anicteric, mmm"~"LUNGS: CTA, no wheezes/rales"~"CV: Reg, S1/S2, 1/6 syst LSB, no gallop"~"ABD: soft, BS+, NT/ND"~"EXT: No edema"~"NEURO: Gross non-focal"~"SKIN: No rash"~"Plan:"~"Doing well status post PCI of LAD.  Will treat residual RCA medically for now."~"Continue aspirin, Plavix, Coreg, Cozaar, Imdur and amlodipine."~"Will try Crestor 20 mg daily and follow exam."~"Okay for discharge"~"Original Note:"~"Today's Communication / Plan"~"-"~"-: "~"Post PCI LAD, residual RCA with collaterals will treat medically"~"DAPT"~"trial of rosuvastatin"~"cardiac rehab"~"stable for d/c home"~"Impression / Plan"~"-"~"-: "~"Primary care physician: Ramana Joy MD"~"Primary cardiologist: Jake Jackson MD"~"89-year-old gentleman with a past medical history notable for hypertension, anxiety, hyperlipidemia, and gout.  He had been tried on atorvastatin in the past but experienced myalgias.  He states that he is experienced midepigastric discomfort at "~"times and has been evaluated for GI and pulmonary etiology.  He is currently maintained on dual antianginal medications chronically and continues to experience intermittent epigastric discomfort and exertional dyspnea for which she is now referred "~"for coronary angiography.  His LDL cholesterol has been under 200 mg/dL.  He has been fine on atorvastatin and ezetimibe which have caused symptoms of fatigue and muscle aches"~"Impression:"~"CAD/Chest pain"~"post PCI LAD x1 SOURAV 9/17/24"~"Residual RCA with L-&gt;R collaterals, med rx"~"Hyperlipidemia - stain intolerance"~"BART"~"Gout"~"GERD"~"HTN"~"Long COVID syndrome"~"Takotsubo cardiomyopathy"~"Osteoarthritis"~"CKD 3b"~"Anxiety"~"Plan:"~"post PCI no cp, sob"~"Rad site stable "~"tele no sig ectopy"~"DAPT ASA/Plavix, will decrease ASA to 81mg daily"~"Will add Isosorbide 30mg for residual RCA disease"~"Stop omeprazole, switch to Protonix while on Plavix"~"His  , he was at first refusing any statin therapy but after long discussion he is agreeable to give rosuvastatin a try"~"Cardiac rehab c/s"~"f/u DCA in 1 mo"~"stable for d/c home today"~"9/17/24 [facility]:"~"1.  Left heart catheterization with coronary angiography"~"2.  Hemodynamic assessment of LAD with a [employer] Verrata wire with the iFR serially measuring below the ischemic threshold at 0.83, 0.84, and 0.84.  The right coronary artery is collateralized from the LAD which may worsen the iFR results"~"3.  Successful stenting of proximal to mid LAD jailing the first diagonal branch with placement of a 3.0 x 30 mm Bala stent that was postdilated to high pressures with a 3.5 mm noncompliant balloon to 12 gabriel distally and 14-16 gabriel in the proximal "~"midportion of the stent"~"Progress Note - Cardiologist"~"Subjective"~"-: "~"Date of Service:  September 18, 2024"~"denies cp, sob"~"Objective"~"Labs: "~"09/18/24 02:02 "~"09/18/24 02:02 "~"Labs"~"Hgb	 12.7 g/dL (13.0-18.0)  L 	09/18/24  02:02    "~"Hct	 35.9 % (39.0-52.0)  L 	09/18/24  02:02    "~"Plt Count	 263 10^3/uL (130-400)  	09/18/24  02:02    "~"Sodium	 138 mmol/L (135-145)  	09/18/24  02:02    "~"Potassium	 4.8 mmol/L (3.5-5.1)  	09/18/24  02:02    "~"BUN	 25 mg/dl (9-20)  H 	09/18/24  02:02    "~"Creatinine	 1.3 mg/dL (0.7-1.3)  	09/18/24  02:02    "~"Glucose	 118 mg/dl (70-99)  H 	09/18/24  02:02    "~"Vital Signs and I&O: "~"Vital Signs"~"Temp	Pulse	Resp	BP	Pulse Ox"~" 97.8 F 	 75 	 20 	 118/67 	 97 "~" 09/18/24 07:49	 09/18/24 08:45	 09/18/24 07:49	 09/18/24 07:47	 09/18/24 07:49"~"Vital Signs"~"Temp	Pulse	Resp	BP	Pulse Ox"~" 97.8 F 	 75 	 20 	 118/67 	 97 "~" 09/18/24 07:49	 09/18/24 08:45	 09/18/24 07:49	 09/18/24 07:47	 09/18/24 07:49"~"Intake & Output"~"	09/16/24	09/17/24	09/18/24	09/19/24"~"	06:59	06:59	06:59	06:59"~"Intake Total			1280 / 1280	"~"Balance			1280 / 1280	"~"Physical Exam"~"Physical Exam"~"-: "~"NAD&lt; AOX3"~"S1, S2, RRR"~"CTAB, non labored, no wheeze"~"SNTND bsx4"~"R rad site c/d/i no HT, good pulse"

## 2024-09-18 NOTE — DOWNTIME
"There was a [employer] Client  Downtime on 9/18/2024 from 0100 to 9/18/2024 at 0300. Downtime documentation of patient's care, including medication administrations, has been reconciled in the electronic record per guidelines. Refer to the "~"patient's paper chart under the miscellaneous tab to see printed paper medication records and downtime forms."

## 2024-09-18 NOTE — PTCARENOTE
Patient discharged to home. IV and tele removed. Discharge teaching completed, patient verbalized understanding

## 2024-10-21 ENCOUNTER — HOSPITAL ENCOUNTER (OUTPATIENT)
Dept: HOSPITAL 99 - CRHB | Age: 89
LOS: 9 days | Discharge: HOME | End: 2024-10-30
Payer: COMMERCIAL

## 2024-10-21 DIAGNOSIS — Z95.5: ICD-10-CM

## 2024-10-21 DIAGNOSIS — I25.10: Primary | ICD-10-CM

## 2024-10-21 PROCEDURE — G0423 INTENS CARDIAC REHAB NO EXER: HCPCS

## 2024-10-21 PROCEDURE — G0422 INTENS CARDIAC REHAB W/EXERC: HCPCS

## 2024-11-16 ENCOUNTER — HOSPITAL ENCOUNTER (EMERGENCY)
Dept: HOSPITAL 99 - EMR | Age: 89
Discharge: HOME | End: 2024-11-16
Payer: COMMERCIAL

## 2024-11-16 VITALS — DIASTOLIC BLOOD PRESSURE: 81 MMHG | SYSTOLIC BLOOD PRESSURE: 175 MMHG

## 2024-11-16 VITALS — DIASTOLIC BLOOD PRESSURE: 74 MMHG | SYSTOLIC BLOOD PRESSURE: 183 MMHG

## 2024-11-16 VITALS — SYSTOLIC BLOOD PRESSURE: 176 MMHG | DIASTOLIC BLOOD PRESSURE: 97 MMHG | RESPIRATION RATE: 18 BRPM

## 2024-11-16 VITALS — DIASTOLIC BLOOD PRESSURE: 71 MMHG | SYSTOLIC BLOOD PRESSURE: 154 MMHG

## 2024-11-16 VITALS — RESPIRATION RATE: 16 BRPM

## 2024-11-16 VITALS — SYSTOLIC BLOOD PRESSURE: 162 MMHG | DIASTOLIC BLOOD PRESSURE: 88 MMHG

## 2024-11-16 VITALS — BODY MASS INDEX: 27.6 KG/M2

## 2024-11-16 DIAGNOSIS — I25.10: ICD-10-CM

## 2024-11-16 DIAGNOSIS — Z79.899: ICD-10-CM

## 2024-11-16 DIAGNOSIS — I25.2: ICD-10-CM

## 2024-11-16 DIAGNOSIS — E78.00: ICD-10-CM

## 2024-11-16 DIAGNOSIS — Z87.891: ICD-10-CM

## 2024-11-16 DIAGNOSIS — I10: Primary | ICD-10-CM

## 2024-11-16 DIAGNOSIS — I42.9: ICD-10-CM

## 2024-11-16 DIAGNOSIS — Z95.5: ICD-10-CM

## 2024-11-16 DIAGNOSIS — K21.9: ICD-10-CM

## 2024-11-16 LAB
ALBUMIN SERPL-MCNC: 4.8 G/DL (ref 3.5–5)
ALP SERPL-CCNC: 102 U/L (ref 38–126)
ALT SERPL-CCNC: 20 U/L (ref 0–50)
AST SERPL-CCNC: 22 U/L (ref 17–59)
BUN SERPL-MCNC: 34 MG/DL (ref 9–20)
CALCIUM SERPL-MCNC: 9.7 MG/DL (ref 8.4–10.2)
CHLORIDE SERPL-SCNC: 103 MMOL/L (ref 98–107)
CO2 SERPL-SCNC: 24 MMOL/L (ref 22–30)
EGFR: 33.51
ERYTHROCYTE [DISTWIDTH] IN BLOOD BY AUTOMATED COUNT: 13.9 % (ref 11.5–14.5)
ESTIMATED CREATININE CLEARANCE: 26 ML/MIN
GLUCOSE SERPL-MCNC: 133 MG/DL (ref 70–99)
HCT VFR BLD AUTO: 41.3 % (ref 39–52)
HGB BLD-MCNC: 13.7 G/DL (ref 13–18)
MCHC RBC AUTO-ENTMCNC: 33.2 G/DL (ref 33–37)
MCV RBC AUTO: 92.2 FL (ref 80–94)
NRBC BLD AUTO-RTO: 0 %
PLATELET # BLD AUTO: 234 10^3/UL (ref 130–400)
POTASSIUM SERPL-SCNC: 4.6 MMOL/L (ref 3.5–5.1)
PROT SERPL-MCNC: 7.5 G/DL (ref 6.3–8.2)
SODIUM SERPL-SCNC: 143 MMOL/L (ref 135–145)
TROPONIN I SERPL-MCNC: < 0.012 NG/ML
TROPONIN I SERPL-MCNC: < 0.012 NG/ML

## 2024-11-16 PROCEDURE — 99284 EMERGENCY DEPT VISIT MOD MDM: CPT

## 2025-05-19 ENCOUNTER — HOSPITAL ENCOUNTER (OUTPATIENT)
Dept: HOSPITAL 99 - HWRAD | Age: 89
End: 2025-05-19
Payer: COMMERCIAL

## 2025-05-19 DIAGNOSIS — R09.89: Primary | ICD-10-CM

## 2025-06-12 ENCOUNTER — HOSPITAL ENCOUNTER (OUTPATIENT)
Dept: HOSPITAL 99 - PAVMRI | Age: 89
End: 2025-06-12
Payer: COMMERCIAL

## 2025-06-12 DIAGNOSIS — M54.16: Primary | ICD-10-CM

## 2025-07-28 ENCOUNTER — HOSPITAL ENCOUNTER (OUTPATIENT)
Dept: HOSPITAL 99 - PET | Age: 89
End: 2025-07-28
Payer: COMMERCIAL

## 2025-07-28 DIAGNOSIS — C61: Primary | ICD-10-CM
